# Patient Record
Sex: FEMALE | Race: OTHER
[De-identification: names, ages, dates, MRNs, and addresses within clinical notes are randomized per-mention and may not be internally consistent; named-entity substitution may affect disease eponyms.]

---

## 2020-10-01 ENCOUNTER — HOSPITAL ENCOUNTER (EMERGENCY)
Dept: HOSPITAL 76 - ED | Age: 60
LOS: 2 days | Discharge: HOME | End: 2020-10-03
Payer: COMMERCIAL

## 2020-10-01 ENCOUNTER — HOSPITAL ENCOUNTER (OUTPATIENT)
Dept: HOSPITAL 76 - EMS | Age: 60
Discharge: ANOTHER HOSPITAL | End: 2020-10-01
Attending: SURGERY
Payer: COMMERCIAL

## 2020-10-01 DIAGNOSIS — T43.212A: Primary | ICD-10-CM

## 2020-10-01 DIAGNOSIS — R94.31: ICD-10-CM

## 2020-10-01 DIAGNOSIS — R53.83: ICD-10-CM

## 2020-10-01 DIAGNOSIS — F10.129: ICD-10-CM

## 2020-10-01 DIAGNOSIS — R40.0: ICD-10-CM

## 2020-10-01 DIAGNOSIS — F32.9: ICD-10-CM

## 2020-10-01 DIAGNOSIS — Z20.828: ICD-10-CM

## 2020-10-01 DIAGNOSIS — E87.6: ICD-10-CM

## 2020-10-01 DIAGNOSIS — F41.9: ICD-10-CM

## 2020-10-01 LAB
ALBUMIN DIAFP-MCNC: 4.1 G/DL (ref 3.2–5.5)
ALBUMIN/GLOB SERPL: 1.5 {RATIO} (ref 1–2.2)
ALP SERPL-CCNC: 69 IU/L (ref 42–121)
ALT SERPL W P-5'-P-CCNC: 23 IU/L (ref 10–60)
AMPHET UR QL SCN: NEGATIVE
ANION GAP SERPL CALCULATED.4IONS-SCNC: 14 MMOL/L (ref 6–13)
APAP SERPL-MCNC: < 10 UG/ML (ref 10–30)
AST SERPL W P-5'-P-CCNC: 24 IU/L (ref 10–42)
BASOPHILS NFR BLD AUTO: 0.1 10^3/UL (ref 0–0.1)
BASOPHILS NFR BLD AUTO: 1.4 %
BENZODIAZ UR QL SCN: NEGATIVE
BILIRUB BLD-MCNC: 0.6 MG/DL (ref 0.2–1)
BUN SERPL-MCNC: 18 MG/DL (ref 6–20)
CALCIUM UR-MCNC: 8.5 MG/DL (ref 8.5–10.3)
CHLORIDE SERPL-SCNC: 103 MMOL/L (ref 101–111)
CLARITY UR REFRACT.AUTO: CLEAR
CO2 SERPL-SCNC: 23 MMOL/L (ref 21–32)
COCAINE UR-SCNC: NEGATIVE UMOL/L
CREAT SERPLBLD-SCNC: 0.8 MG/DL (ref 0.4–1)
EOSINOPHIL # BLD AUTO: 0.1 10^3/UL (ref 0–0.7)
EOSINOPHIL NFR BLD AUTO: 1 %
ERYTHROCYTE [DISTWIDTH] IN BLOOD BY AUTOMATED COUNT: 13.8 % (ref 12–15)
GLOBULIN SER-MCNC: 2.8 G/DL (ref 2.1–4.2)
GLUCOSE SERPL-MCNC: 111 MG/DL (ref 70–100)
GLUCOSE UR QL STRIP.AUTO: NEGATIVE MG/DL
HGB UR QL STRIP: 13.8 G/DL (ref 12–16)
KETONES UR QL STRIP.AUTO: NEGATIVE MG/DL
LIPASE SERPL-CCNC: 46 U/L (ref 22–51)
LYMPHOCYTES # SPEC AUTO: 0.8 10^3/UL (ref 1.5–3.5)
LYMPHOCYTES NFR BLD AUTO: 14.4 %
MCH RBC QN AUTO: 31.2 PG (ref 27–31)
MCHC RBC AUTO-ENTMCNC: 33.8 G/DL (ref 32–36)
MCV RBC AUTO: 92.3 FL (ref 81–99)
METHADONE UR QL SCN: NEGATIVE
METHAMPHET UR QL SCN: NEGATIVE
MONOCYTES # BLD AUTO: 0.3 10^3/UL (ref 0–1)
MONOCYTES NFR BLD AUTO: 5.2 %
NEUTROPHILS # BLD AUTO: 4.5 10^3/UL (ref 1.5–6.6)
NEUTROPHILS # SNV AUTO: 5.8 X10^3/UL (ref 4.8–10.8)
NEUTROPHILS NFR BLD AUTO: 77.3 %
NITRITE UR QL STRIP.AUTO: NEGATIVE
OPIATES UR QL SCN: NEGATIVE
PDW BLD AUTO: 9.5 FL (ref 7.9–10.8)
PH UR STRIP.AUTO: 6 PH (ref 5–7.5)
PLATELET # BLD: 290 10^3/UL (ref 130–450)
PROT SPEC-MCNC: 6.9 G/DL (ref 6.7–8.2)
PROT UR STRIP.AUTO-MCNC: NEGATIVE MG/DL
RBC # UR STRIP.AUTO: NEGATIVE /UL
RBC MAR: 4.42 10^6/UL (ref 4.2–5.4)
SALICYLATES SERPL-MCNC: < 6 MG/DL
SODIUM SERPLBLD-SCNC: 140 MMOL/L (ref 135–145)
SP GR UR STRIP.AUTO: 1.02 (ref 1–1.03)
UROBILINOGEN UR QL STRIP.AUTO: (no result) E.U./DL
UROBILINOGEN UR STRIP.AUTO-MCNC: NEGATIVE MG/DL
VOLATILE DRUGS POS SERPL SCN: (no result)

## 2020-10-01 PROCEDURE — 81003 URINALYSIS AUTO W/O SCOPE: CPT

## 2020-10-01 PROCEDURE — 87086 URINE CULTURE/COLONY COUNT: CPT

## 2020-10-01 PROCEDURE — 80306 DRUG TEST PRSMV INSTRMNT: CPT

## 2020-10-01 PROCEDURE — 87635 SARS-COV-2 COVID-19 AMP PRB: CPT

## 2020-10-01 PROCEDURE — 96361 HYDRATE IV INFUSION ADD-ON: CPT

## 2020-10-01 PROCEDURE — 83735 ASSAY OF MAGNESIUM: CPT

## 2020-10-01 PROCEDURE — 85025 COMPLETE CBC W/AUTO DIFF WBC: CPT

## 2020-10-01 PROCEDURE — 81001 URINALYSIS AUTO W/SCOPE: CPT

## 2020-10-01 PROCEDURE — 36415 COLL VENOUS BLD VENIPUNCTURE: CPT

## 2020-10-01 PROCEDURE — 80329 ANALGESICS NON-OPIOID 1 OR 2: CPT

## 2020-10-01 PROCEDURE — 96365 THER/PROPH/DIAG IV INF INIT: CPT

## 2020-10-01 PROCEDURE — 99284 EMERGENCY DEPT VISIT MOD MDM: CPT

## 2020-10-01 PROCEDURE — 96366 THER/PROPH/DIAG IV INF ADDON: CPT

## 2020-10-01 PROCEDURE — 80320 DRUG SCREEN QUANTALCOHOLS: CPT

## 2020-10-01 PROCEDURE — 96367 TX/PROPH/DG ADDL SEQ IV INF: CPT

## 2020-10-01 PROCEDURE — 80053 COMPREHEN METABOLIC PANEL: CPT

## 2020-10-01 PROCEDURE — 84443 ASSAY THYROID STIM HORMONE: CPT

## 2020-10-01 PROCEDURE — 93005 ELECTROCARDIOGRAM TRACING: CPT

## 2020-10-01 PROCEDURE — 83690 ASSAY OF LIPASE: CPT

## 2020-10-01 PROCEDURE — 80307 DRUG TEST PRSMV CHEM ANLYZR: CPT

## 2020-10-01 PROCEDURE — 80048 BASIC METABOLIC PNL TOTAL CA: CPT

## 2020-10-01 NOTE — ED PHYSICIAN DOCUMENTATION
History of Present Illness





- Stated complaint


Stated Complaint: OD





- History obtained from


History obtained from: Family, EMS





- History of Present Illness


Timing: Prior to arrival





- Additonal information


Additional information: 


59-year-old female brought into the emergency department for an intentional 

overdose this evening. It is reported that she may have taken up to sixty 50 mg 

tablets of trazodone.  She has a longstanding history of alcohol abuse as well 

as previous suicide attempt.  She recently completed a 30-day inpatient stay for

alcohol abuse and was discharged 3 weeks ago.  She relapsed and began drinking 2

weeks ago.  EMS reports that she got into an argument with her  this 

evening.  When he left the home she took the trazodone.  When he returned home 

she told him what she had done.  She also reportedly vomited white foam into the

toilet. She had been very alert and oriented for EMS as well as hemodynamically 

stable





When I speak to the patient she is somnolent but easily arousable.  She admits 

to taking the bottle of trazodone.  She states she wanted to go to sleep and 

never wake up.  She states that today she has drank 5 or 6 beers.  She said she 

thought it was a small enough volume that her  would not notice.








Review of Systems


Constitutional: reports: Reviewed and negative


Nose: reports: Reviewed and negative


Throat: reports: Reviewed and negative


Cardiac: reports: Reviewed and negative


Respiratory: reports: Reviewed and negative


GI: reports: Reviewed and negative


: reports: Reviewed and negative


Skin: reports: Reviewed and negative


Musculoskeletal: reports: Reviewed and negative


Neurologic: denies: Generalized weakness, Focal weakness, Numbness, Difficulty 

speaking, Near syncope, Syncope, Seizure, Confused


Psychiatric: reports: Depressed, Suicidal, Other (ETOH abuse).  denies: 

Hallucinations, Delusions, Anxiety


Endocrine: reports: Reviewed and negative





PD PAST MEDICAL HISTORY





- Allergies


Allergies/Adverse Reactions: 


                                    Allergies











Allergy/AdvReac Type Severity Reaction Status Date / Time


 


Sulfa (Sulfonamide Allergy  Unknown Verified 10/01/20 20:16





Antibiotics)     














PD ED PE NORMAL





- General


General: Alert and oriented X 3, No acute distress, Well developed/nourished





- HEENT


HEENT: Atraumatic, EOMI, Moist mucous membranes





- Neck


Neck: Supple, no meningeal sign, No adenopathy





- Cardiac


Cardiac: RRR, No murmur





- Respiratory


Respiratory: No respiratory distress, Clear bilaterally





- Abdomen


Abdomen: Normal bowel sounds, Soft, Non tender, Non distended





- Back


Back: No CVA TTP





- Derm


Derm: Normal color, Warm and dry, No rash





- Extremities


Extremities: No deformity, No tenderness to palpate





- Neuro


Neuro: Alert and oriented X 3, CNs 2-12 intact, No motor deficit, No sensory 

deficit


Eye Opening: To Voice


Motor: Obeys Commands


Verbal: Oriented


GCS Score: 14





Results





- Vitals


Vitals: 


                               Vital Signs - 24 hr











  10/01/20 10/01/20





  20:16 21:17


 


Temperature 36.5 C 


 


Heart Rate 50 L 79


 


Respiratory 16 14





Rate  


 


Blood Pressure 121/57 L 158/58 H


 


O2 Saturation 98 100








                                     Oxygen











O2 Source                      Room air

















- EKG (time done)


  ** 2010


Rate: Rate (enter#) (92)


Rhythm: NSR


Axis: Normal


Intervals: Normal DC, Prolonged QT (? 604)


QRS: Normal


Ischemia: Non specific changes


Compare to prior EKG: Old EKG unavailable


Computer interpretation: Agree with computer





- Labs


Labs: 


                                Laboratory Tests











  10/01/20 10/01/20 10/01/20





  20:01 20:18 20:18


 


WBC   5.8 


 


RBC   4.42 


 


Hgb   13.8 


 


Hct   40.8 


 


MCV   92.3 


 


MCH   31.2 H 


 


MCHC   33.8 


 


RDW   13.8 


 


Plt Count   290 


 


MPV   9.5 


 


Neut # (Auto)   4.5 


 


Lymph # (Auto)   0.8 L 


 


Mono # (Auto)   0.3 


 


Eos # (Auto)   0.1 


 


Baso # (Auto)   0.1 


 


Absolute Nucleated RBC   0.00 


 


Nucleated RBC %   0.0 


 


Sodium    140


 


Potassium    3.0 L


 


Chloride    103


 


Carbon Dioxide    23


 


Anion Gap    14.0 H


 


BUN    18


 


Creatinine    0.8


 


Estimated GFR (MDRD)    74 L


 


Glucose    111 H


 


Calcium    8.5


 


Magnesium  2.4  


 


Total Bilirubin    0.6


 


AST    24


 


ALT    23


 


Alkaline Phosphatase    69


 


Total Protein    6.9


 


Albumin    4.1


 


Globulin    2.8


 


Albumin/Globulin Ratio    1.5


 


Lipase    46


 


TSH   


 


Urine Color   


 


Urine Clarity   


 


Urine pH   


 


Ur Specific Gravity   


 


Urine Protein   


 


Urine Glucose (UA)   


 


Urine Ketones   


 


Urine Occult Blood   


 


Urine Nitrite   


 


Urine Bilirubin   


 


Urine Urobilinogen   


 


Ur Leukocyte Esterase   


 


Ur Microscopic Review   


 


Urine Culture Comments   


 


Salicylates    < 6.0


 


Urine Opiates Screen   


 


Ur Oxycodone Screen   


 


Urine Methadone Screen   


 


Ur Propoxyphene Screen   


 


Acetaminophen    < 10 L


 


Ur Barbiturates Screen   


 


Ur Tricyclics Screen   


 


Ur Phencyclidine Scrn   


 


Ur Amphetamine Screen   


 


U Methamphetamines Scrn   


 


U Benzodiazepines Scrn   


 


Urine Cocaine Screen   


 


U Cannabinoids Screen   


 


Ethyl Alcohol    161.3














  10/01/20 10/01/20 10/01/20





  20:18 20:18 21:10


 


WBC   


 


RBC   


 


Hgb   


 


Hct   


 


MCV   


 


MCH   


 


MCHC   


 


RDW   


 


Plt Count   


 


MPV   


 


Neut # (Auto)   


 


Lymph # (Auto)   


 


Mono # (Auto)   


 


Eos # (Auto)   


 


Baso # (Auto)   


 


Absolute Nucleated RBC   


 


Nucleated RBC %   


 


Sodium   


 


Potassium   


 


Chloride   


 


Carbon Dioxide   


 


Anion Gap   


 


BUN   


 


Creatinine   


 


Estimated GFR (MDRD)   


 


Glucose   


 


Calcium   


 


Magnesium   


 


Total Bilirubin   


 


AST   


 


ALT   


 


Alkaline Phosphatase   


 


Total Protein   


 


Albumin   


 


Globulin   


 


Albumin/Globulin Ratio   


 


Lipase   


 


TSH  2.00  


 


Urine Color    YELLOW


 


Urine Clarity    CLEAR


 


Urine pH    6.0


 


Ur Specific Gravity    1.020


 


Urine Protein    NEGATIVE


 


Urine Glucose (UA)    NEGATIVE


 


Urine Ketones    NEGATIVE


 


Urine Occult Blood    NEGATIVE


 


Urine Nitrite    NEGATIVE


 


Urine Bilirubin    NEGATIVE


 


Urine Urobilinogen    0.2 (NORMAL)


 


Ur Leukocyte Esterase    NEGATIVE


 


Ur Microscopic Review    NOT INDICATED


 


Urine Culture Comments    NOT INDICATED


 


Salicylates   


 


Urine Opiates Screen    NEGATIVE


 


Ur Oxycodone Screen    NEGATIVE


 


Urine Methadone Screen    NEGATIVE


 


Ur Propoxyphene Screen    NEGATIVE


 


Acetaminophen   


 


Ur Barbiturates Screen    NEGATIVE


 


Ur Tricyclics Screen    NEGATIVE


 


Ur Phencyclidine Scrn    NEGATIVE


 


Ur Amphetamine Screen    NEGATIVE


 


U Methamphetamines Scrn    NEGATIVE


 


U Benzodiazepines Scrn    NEGATIVE


 


Urine Cocaine Screen    NEGATIVE


 


U Cannabinoids Screen    NEGATIVE


 


Ethyl Alcohol   163.0 














PD MEDICAL DECISION MAKING





- ED course


Complexity details: reviewed results, re-evaluated patient, considered 

differential, d/w patient


ED course: 


59-year-old female presents to the emergency department with intentional 

suicidal attempt after taking a bottle of trazodone. Nursing staff has 

communicated with poison control.  The biggest side effect of trazodone overdose

is simply somnolence and sleepiness.  We do need to monitor for hypotension and 

bradycardia.  Routine mental health screening labs are pending at this time.  

Patient is alert and hemodynamically stable. Once we are able to medically clear

her she will likely require tele-psych or DCR for further placement





2100:  EKG is sinus however she may have a prolonged QTc syndrome 604..  I do 

note moderate hypokalemia with potassium at 3.0.  We will institute potassium 

supplementation and recheck her EKG. Her magnesium is normal. Patient is 

sleeping but arousable.  She is able to properly converse with me and reports 

that she has tried to kill herself in the past.  She is upset that she has 

relapsed on alcohol again and tells me that she is planning to go to an 

inpatient alcohol facility on Monday for another 30-day treatment stent. 


2220: Patient will be signed out to nocturnal colleague Dr. Beltrán.  She will 

follow-up on repeat alcohol level in order to make sure she can be medically 

cleared as well as repeating the EKG to ensure the QTC has normalized.








Departure





- Departure


Clinical Impression: 


 Suicide attempt, Overdose of trazodone, ETOH abuse

## 2020-10-02 LAB
ANION GAP SERPL CALCULATED.4IONS-SCNC: 11 MMOL/L (ref 6–13)
BUN SERPL-MCNC: 16 MG/DL (ref 6–20)
CALCIUM UR-MCNC: 8 MG/DL (ref 8.5–10.3)
CHLORIDE SERPL-SCNC: 107 MMOL/L (ref 101–111)
CO2 SERPL-SCNC: 23 MMOL/L (ref 21–32)
CREAT SERPLBLD-SCNC: 0.8 MG/DL (ref 0.4–1)
GLUCOSE SERPL-MCNC: 113 MG/DL (ref 70–100)
SODIUM SERPLBLD-SCNC: 141 MMOL/L (ref 135–145)
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
## 2020-10-03 VITALS — DIASTOLIC BLOOD PRESSURE: 77 MMHG | SYSTOLIC BLOOD PRESSURE: 110 MMHG

## 2020-10-03 NOTE — ED PHYSICIAN DOCUMENTATION
ED Addendum





- Addendum


Addendum: 





10/03/20 09:58


59-year-old female with history of depression and alcohol abuse had a impulsive 

overdose of trazodone while intoxicated.  She is no longer intoxicated she is no

longer suicidal and arrangements have been made for the patient to go to a dual 

diagnosis treatment center in [place].  Her  will escort her to the 

airport and someone will pick her up in [place] to take her to the [facility].  The patient indicates she is no longer suicidal and she is invested

in her treatment as this appears to have happened after an argument with her 

 regarding her drinking.  The  feels confident he will be able to 

take his wife down to the airport without incident and the couple are discharged

from the emergency department after her COVID test was negative from [facility] 

in [place].